# Patient Record
Sex: FEMALE | Race: WHITE | NOT HISPANIC OR LATINO | Employment: FULL TIME | URBAN - NONMETROPOLITAN AREA
[De-identification: names, ages, dates, MRNs, and addresses within clinical notes are randomized per-mention and may not be internally consistent; named-entity substitution may affect disease eponyms.]

---

## 2022-09-13 ENCOUNTER — OFFICE VISIT (OUTPATIENT)
Dept: ENDOCRINOLOGY | Facility: CLINIC | Age: 62
End: 2022-09-13

## 2022-09-13 ENCOUNTER — LAB (OUTPATIENT)
Dept: LAB | Facility: HOSPITAL | Age: 62
End: 2022-09-13

## 2022-09-13 VITALS
RESPIRATION RATE: 18 BRPM | HEIGHT: 67 IN | DIASTOLIC BLOOD PRESSURE: 78 MMHG | BODY MASS INDEX: 35.06 KG/M2 | SYSTOLIC BLOOD PRESSURE: 126 MMHG | WEIGHT: 223.4 LBS | HEART RATE: 76 BPM | OXYGEN SATURATION: 98 %

## 2022-09-13 DIAGNOSIS — L65.9 HAIR LOSS: ICD-10-CM

## 2022-09-13 DIAGNOSIS — E03.9 ACQUIRED HYPOTHYROIDISM: Primary | ICD-10-CM

## 2022-09-13 DIAGNOSIS — E66.9 CLASS 1 OBESITY WITH BODY MASS INDEX (BMI) OF 34.0 TO 34.9 IN ADULT, UNSPECIFIED OBESITY TYPE, UNSPECIFIED WHETHER SERIOUS COMORBIDITY PRESENT: ICD-10-CM

## 2022-09-13 PROBLEM — E66.811 CLASS 1 OBESITY WITH BODY MASS INDEX (BMI) OF 34.0 TO 34.9 IN ADULT: Status: ACTIVE | Noted: 2022-09-13

## 2022-09-13 LAB
ALBUMIN SERPL-MCNC: 4.3 G/DL (ref 3.5–5.2)
ALBUMIN/GLOB SERPL: 1.3 G/DL
ALP SERPL-CCNC: 73 U/L (ref 39–117)
ALT SERPL W P-5'-P-CCNC: 11 U/L (ref 1–33)
ANION GAP SERPL CALCULATED.3IONS-SCNC: 8 MMOL/L (ref 5–15)
AST SERPL-CCNC: 15 U/L (ref 1–32)
BASOPHILS # BLD AUTO: 0.07 10*3/MM3 (ref 0–0.2)
BASOPHILS NFR BLD AUTO: 0.9 % (ref 0–1.5)
BILIRUB SERPL-MCNC: 0.4 MG/DL (ref 0–1.2)
BUN SERPL-MCNC: 10 MG/DL (ref 8–23)
BUN/CREAT SERPL: 14.1 (ref 7–25)
CALCIUM SPEC-SCNC: 9.1 MG/DL (ref 8.6–10.5)
CHLORIDE SERPL-SCNC: 104 MMOL/L (ref 98–107)
CO2 SERPL-SCNC: 28 MMOL/L (ref 22–29)
CREAT SERPL-MCNC: 0.71 MG/DL (ref 0.57–1)
DEPRECATED RDW RBC AUTO: 44.7 FL (ref 37–54)
EGFRCR SERPLBLD CKD-EPI 2021: 96.9 ML/MIN/1.73
EOSINOPHIL # BLD AUTO: 0.13 10*3/MM3 (ref 0–0.4)
EOSINOPHIL NFR BLD AUTO: 1.6 % (ref 0.3–6.2)
ERYTHROCYTE [DISTWIDTH] IN BLOOD BY AUTOMATED COUNT: 13.1 % (ref 12.3–15.4)
GLOBULIN UR ELPH-MCNC: 3.2 GM/DL
GLUCOSE SERPL-MCNC: 91 MG/DL (ref 65–99)
HCT VFR BLD AUTO: 43.2 % (ref 34–46.6)
HGB BLD-MCNC: 14.4 G/DL (ref 12–15.9)
IMM GRANULOCYTES # BLD AUTO: 0.03 10*3/MM3 (ref 0–0.05)
IMM GRANULOCYTES NFR BLD AUTO: 0.4 % (ref 0–0.5)
LYMPHOCYTES # BLD AUTO: 2.72 10*3/MM3 (ref 0.7–3.1)
LYMPHOCYTES NFR BLD AUTO: 34.3 % (ref 19.6–45.3)
MCH RBC QN AUTO: 30.8 PG (ref 26.6–33)
MCHC RBC AUTO-ENTMCNC: 33.3 G/DL (ref 31.5–35.7)
MCV RBC AUTO: 92.5 FL (ref 79–97)
MONOCYTES # BLD AUTO: 0.64 10*3/MM3 (ref 0.1–0.9)
MONOCYTES NFR BLD AUTO: 8.1 % (ref 5–12)
NEUTROPHILS NFR BLD AUTO: 4.33 10*3/MM3 (ref 1.7–7)
NEUTROPHILS NFR BLD AUTO: 54.7 % (ref 42.7–76)
NRBC BLD AUTO-RTO: 0 /100 WBC (ref 0–0.2)
PLATELET # BLD AUTO: 298 10*3/MM3 (ref 140–450)
PMV BLD AUTO: 11.2 FL (ref 6–12)
POTASSIUM SERPL-SCNC: 4.2 MMOL/L (ref 3.5–5.2)
PROT SERPL-MCNC: 7.5 G/DL (ref 6–8.5)
RBC # BLD AUTO: 4.67 10*6/MM3 (ref 3.77–5.28)
SODIUM SERPL-SCNC: 140 MMOL/L (ref 136–145)
WBC NRBC COR # BLD: 7.92 10*3/MM3 (ref 3.4–10.8)

## 2022-09-13 PROCEDURE — 86376 MICROSOMAL ANTIBODY EACH: CPT | Performed by: NURSE PRACTITIONER

## 2022-09-13 PROCEDURE — 86258 DGP ANTIBODY EACH IG CLASS: CPT | Performed by: NURSE PRACTITIONER

## 2022-09-13 PROCEDURE — 84466 ASSAY OF TRANSFERRIN: CPT | Performed by: NURSE PRACTITIONER

## 2022-09-13 PROCEDURE — 82784 ASSAY IGA/IGD/IGG/IGM EACH: CPT | Performed by: NURSE PRACTITIONER

## 2022-09-13 PROCEDURE — 99204 OFFICE O/P NEW MOD 45 MIN: CPT | Performed by: NURSE PRACTITIONER

## 2022-09-13 PROCEDURE — 36415 COLL VENOUS BLD VENIPUNCTURE: CPT | Performed by: NURSE PRACTITIONER

## 2022-09-13 PROCEDURE — 80050 GENERAL HEALTH PANEL: CPT | Performed by: NURSE PRACTITIONER

## 2022-09-13 PROCEDURE — 82306 VITAMIN D 25 HYDROXY: CPT | Performed by: NURSE PRACTITIONER

## 2022-09-13 PROCEDURE — 82607 VITAMIN B-12: CPT | Performed by: NURSE PRACTITIONER

## 2022-09-13 PROCEDURE — 86364 TISS TRNSGLTMNASE EA IG CLAS: CPT | Performed by: NURSE PRACTITIONER

## 2022-09-13 PROCEDURE — 83540 ASSAY OF IRON: CPT | Performed by: NURSE PRACTITIONER

## 2022-09-13 RX ORDER — PANTOPRAZOLE SODIUM 40 MG/1
TABLET, DELAYED RELEASE ORAL
COMMUNITY

## 2022-09-13 RX ORDER — LEVOTHYROXINE SODIUM 0.1 MG/1
TABLET ORAL
COMMUNITY
Start: 2022-05-13 | End: 2022-09-15

## 2022-09-13 RX ORDER — NYSTATIN 100000 U/G
OINTMENT TOPICAL
COMMUNITY

## 2022-09-13 RX ORDER — NYSTATIN 100000 [USP'U]/G
POWDER TOPICAL
COMMUNITY
Start: 2022-02-08

## 2022-09-13 NOTE — PROGRESS NOTES
"Chief Complaint  Establish Care (thyroid)    Subjective          Roma Saab presents to [facility] ENDOCRINOLOGY  History of Present Illness     In office visit     Referring provider Albert Dale MD       Chief Complaint   Patient presents with   • Establish Care     thyroid       HPI    61 year old female presents for consultation         Reason -- hypothyroidism     Duration-- diagnosed at age 42  (2002 )     Context --routine lab work     Timing --constant     Quality  Not controlled     Severity high     Jan. 2022  Had COVID and trouble with thyroid labs since at that point       allevating factors compliance with medication    Brand name only     Last TSH 0.1      Review of Systems - General ROS: positive for  - fatigue, weight gain and hair loss  Endocrine ROS: positive for - malaise/lethargy                  Objective   Vital Signs:   /78   Pulse 76   Resp 18   Ht 170.2 cm (67\")   Wt 101 kg (223 lb 6.4 oz)   SpO2 98%   BMI 34.99 kg/m²     Physical Exam  Constitutional:       Appearance: Normal appearance.   Cardiovascular:      Rate and Rhythm: Regular rhythm.      Heart sounds: Normal heart sounds.   Pulmonary:      Breath sounds: Normal breath sounds.   Musculoskeletal:         General: Normal range of motion.      Cervical back: Normal range of motion and neck supple.   Neurological:      Mental Status: She is alert.        Result Review :   The following data was reviewed by: NEFTALY Madera on 09/13/2022:                Assessment and Plan    Diagnoses and all orders for this visit:    1. Acquired hypothyroidism (Primary)  -     CBC & Differential  -     Comprehensive Metabolic Panel  -     TSH  -     Vitamin D 25 Hydroxy  -     Vitamin B12  -     Celiac Panel Reflex To Titer  -     Iron Profile  -     Thyroid Peroxidase Antibody    2. Hair loss  -     CBC & Differential  -     Comprehensive Metabolic Panel  -     TSH  -     Vitamin D 25 Hydroxy  - "     Vitamin B12  -     Celiac Panel Reflex To Titer  -     Iron Profile  -     Thyroid Peroxidase Antibody    3. Class 1 obesity with body mass index (BMI) of 34.0 to 34.9 in adult, unspecified obesity type, unspecified whether serious comorbidity present                   Thyroid Health    Hypothyroidism       May 2022     TSH - 0.124     Taking brand name synthroid 100 mcg daily       Was on NP thyroid in the past     Reassess TSH today       She does take her medication correctly     Check for malabsorption     Celiac panel, iron profile, B12 and vitamin d     Hair loss      Assess above labs     Weight management    Obesity     Body mass index is 34.99 kg/m².      Records from  received and reviewed from 2022  Thank you for this consultation         Follow Up   Return in about 3 months (around 12/13/2022).  Patient was given instructions and counseling regarding her condition or for health maintenance advice. Please see specific information pulled into the AVS if appropriate.         This document has been electronically signed by NEFTALY Madera on September 13, 2022 15:42 CDT.

## 2022-09-14 ENCOUNTER — TELEPHONE (OUTPATIENT)
Dept: ENDOCRINOLOGY | Facility: CLINIC | Age: 62
End: 2022-09-14

## 2022-09-14 DIAGNOSIS — E03.9 ACQUIRED HYPOTHYROIDISM: Primary | ICD-10-CM

## 2022-09-14 LAB
25(OH)D3 SERPL-MCNC: 27 NG/ML (ref 30–100)
IRON 24H UR-MRATE: 75 MCG/DL (ref 37–145)
IRON SATN MFR SERPL: 19 % (ref 20–50)
TIBC SERPL-MCNC: 393 MCG/DL (ref 298–536)
TRANSFERRIN SERPL-MCNC: 264 MG/DL (ref 200–360)
TSH SERPL DL<=0.05 MIU/L-ACNC: 1.49 UIU/ML (ref 0.27–4.2)
VIT B12 BLD-MCNC: 448 PG/ML (ref 211–946)

## 2022-09-15 LAB
GLIADIN PEPTIDE IGA SER-ACNC: 2 UNITS (ref 0–19)
IGA SERPL-MCNC: 174 MG/DL (ref 87–352)
THYROPEROXIDASE AB SERPL-ACNC: 15 IU/ML (ref 0–34)
TTG IGA SER-ACNC: <2 U/ML (ref 0–3)

## 2022-09-15 RX ORDER — LEVOTHYROXINE SODIUM 0.1 MG/1
100 TABLET ORAL DAILY
COMMUNITY

## 2022-11-09 ENCOUNTER — TELEPHONE (OUTPATIENT)
Dept: ENDOCRINOLOGY | Facility: CLINIC | Age: 62
End: 2022-11-09

## 2022-11-09 NOTE — TELEPHONE ENCOUNTER
Pt called requesting to speak with Derrek. She stated she has been messaging back and forth with her and it's not making sense to her what Derrek is saying and she would like to speak with her to clarify. Could someone please reach out to her.    Please advise.    Thanks

## 2024-09-09 ENCOUNTER — PATIENT ROUNDING (BHMG ONLY) (OUTPATIENT)
Dept: FAMILY MEDICINE CLINIC | Facility: CLINIC | Age: 64
End: 2024-09-09
Payer: COMMERCIAL

## 2024-09-09 ENCOUNTER — OFFICE VISIT (OUTPATIENT)
Dept: FAMILY MEDICINE CLINIC | Facility: CLINIC | Age: 64
End: 2024-09-09
Payer: COMMERCIAL

## 2024-09-09 ENCOUNTER — TELEPHONE (OUTPATIENT)
Dept: FAMILY MEDICINE CLINIC | Facility: CLINIC | Age: 64
End: 2024-09-09

## 2024-09-09 VITALS
OXYGEN SATURATION: 96 % | SYSTOLIC BLOOD PRESSURE: 120 MMHG | WEIGHT: 235.4 LBS | TEMPERATURE: 97.7 F | DIASTOLIC BLOOD PRESSURE: 81 MMHG | HEART RATE: 70 BPM | HEIGHT: 67 IN | BODY MASS INDEX: 36.95 KG/M2

## 2024-09-09 DIAGNOSIS — R09.81 SINUS CONGESTION: ICD-10-CM

## 2024-09-09 DIAGNOSIS — Z23 NEED FOR TDAP VACCINATION: ICD-10-CM

## 2024-09-09 DIAGNOSIS — Z12.31 ENCOUNTER FOR SCREENING MAMMOGRAM FOR MALIGNANT NEOPLASM OF BREAST: ICD-10-CM

## 2024-09-09 DIAGNOSIS — Z00.00 ENCOUNTER FOR MEDICAL EXAMINATION TO ESTABLISH CARE: Primary | ICD-10-CM

## 2024-09-09 DIAGNOSIS — R11.0 NAUSEA: Primary | ICD-10-CM

## 2024-09-09 DIAGNOSIS — Z00.00 ENCOUNTER FOR ANNUAL PHYSICAL EXAM: ICD-10-CM

## 2024-09-09 DIAGNOSIS — Z86.010 HISTORY OF COLON POLYPS: ICD-10-CM

## 2024-09-09 PROCEDURE — 99396 PREV VISIT EST AGE 40-64: CPT | Performed by: NURSE PRACTITIONER

## 2024-09-09 PROCEDURE — 99214 OFFICE O/P EST MOD 30 MIN: CPT | Performed by: NURSE PRACTITIONER

## 2024-09-09 RX ORDER — NAPROXEN 500 MG/1
500 TABLET ORAL AS NEEDED
COMMUNITY

## 2024-09-09 RX ORDER — SEMAGLUTIDE 1.34 MG/ML
0.25 INJECTION, SOLUTION SUBCUTANEOUS WEEKLY
COMMUNITY
Start: 2024-07-09

## 2024-09-09 RX ORDER — FEXOFENADINE HCL AND PSEUDOEPHEDRINE HCI 60; 120 MG/1; MG/1
1 TABLET, EXTENDED RELEASE ORAL 2 TIMES DAILY
Qty: 30 TABLET | Refills: 2 | Status: SHIPPED | OUTPATIENT
Start: 2024-09-09

## 2024-09-09 RX ORDER — ONDANSETRON 4 MG/1
4 TABLET, ORALLY DISINTEGRATING ORAL EVERY 8 HOURS PRN
Qty: 30 TABLET | Refills: 1 | Status: SHIPPED | OUTPATIENT
Start: 2024-09-09

## 2024-09-09 RX ORDER — ONDANSETRON 8 MG/1
8 TABLET, ORALLY DISINTEGRATING ORAL EVERY 8 HOURS PRN
COMMUNITY
End: 2024-09-09 | Stop reason: SDUPTHER

## 2024-09-09 NOTE — PROGRESS NOTES
NEFTALY Collado  [facility]   Family Medicine  [address]  [place] [address]  Phone: [phone]  Fax: [phone]         Chief Complaint:  Chief Complaint   Patient presents with    [facility] [place] [facility]        History:  Roma Saab is a 63 y.o. female that is an established patient. She  is here for evaluation of the above complaint and management of chronic conditions.    HPI   History of Present Illness  The patient presents for evaluation of multiple medical concerns.    She reports that her weight has remained stable since starting semaglutide, but she has noticed a decrease in her waist size and her clothes are fitting more loosely. She is currently working from home and has resumed walking. Her ideal weight is 165 pounds, but she is aiming for an initial goal of 200 pounds. She is currently on 0.25 mg compounded semaglutide from \[facility]"" [facility].    She mentions that she had thrush under her breast, which has improved with the use of prebiotics and probiotics.    She is on Synthroid 100 mcg, taken six days a week. Her last thyroid study was conducted on 06/02/2024, with a TSH level of 2.59. she has seen endocrinology in [place].    She underwent a partial hysterectomy due to endometriosis in her 40s, with her uterus removed but ovaries retained. She did not experience menopause in her 50s. She is due for a mammogram and has not received a tetanus vaccine in the past 10 years.    She experienced severe headaches for over a month, accompanied by constant congestion. She also reports vision and ear problems due to sinus issues. She found relief from Claritin-D, which she took daily until her supply ran out. She has tried Flonase nasal spray in the past without success.    She has a history of polyps, with approximately 20 detected during a colonoscopy two years ago. She expresses concern about the number of polyps and the recommended 10-year interval between  colonoscopies. She also mentions a previous detection of precancerous cells in [place], which were removed and required follow-up every six months.    On 07/19/2024, she tripped at work and dislocated her shoulder and broke her humerus. She is now 75 percent healed. She has been doing a lot of exercises in the pool. She did not have surgery; the shoulder was put back in place. Today is her first day back to work. She used to be a very energetic person. She has already done physical therapy. She went to the ortho doctor last Wednesday and she was ready to move on and do something more strenuous. She has 6 visits left for physical therapy.    FAMILY HISTORY  She denies any family history of breast cancer.      Results  Laboratory Studies  TSH 2.59.       ROS:  Review of Systems   Constitutional: Negative.    HENT:  Positive for congestion. Negative for rhinorrhea, sinus pressure and sinus pain.    Respiratory: Negative.     Cardiovascular: Negative.    Gastrointestinal: Negative.    Musculoskeletal:         Left shoulder decreased ROM   Psychiatric/Behavioral: Negative.           reports that she has quit smoking. Her smoking use included cigarettes. She started smoking about 30 years ago. She has a 30 pack-year smoking history. She has been exposed to tobacco smoke. She has never used smokeless tobacco. She reports that she does not currently use alcohol. She reports that she does not use drugs.    Current Outpatient Medications   Medication Instructions    fexofenadine-pseudoephedrine (Allegra-D Allergy & Congestion)  MG per 12 hr tablet 1 tablet, Oral, 2 Times Daily    levothyroxine (SYNTHROID, LEVOTHROID) 100 mcg, Oral, Daily    naproxen (NAPROSYN) 500 mg, Oral, As Needed    nystatin (MYCOSTATIN) 811938 UNIT/GM ointment nystatin 100,000 unit/gram topical ointment    nystatin (MYCOSTATIN) 826905 UNIT/GM powder APPLY TO THE AFFECTED AREA(S) BY TOPICAL ROUTE 2 TIMES PER DAY    ondansetron ODT (ZOFRAN-ODT) 8  "mg, Translingual, Every 8 Hours PRN    Ozempic (0.25 or 0.5 MG/DOSE) 0.25 mg, Subcutaneous, Weekly    pantoprazole (Protonix) 40 MG EC tablet Protonix 40 mg tablet,delayed release   Take 1 tablet every day by oral route.       OBJECTIVE:  /81 (BP Location: Right arm, Patient Position: Sitting, Cuff Size: Adult)   Pulse 70   Temp 97.7 °F (36.5 °C) (Temporal)   Ht 170.2 cm (67\")   Wt 107 kg (235 lb 6.4 oz)   SpO2 96%   BMI 36.87 kg/m²    Physical Exam  Vitals and nursing note reviewed.   Constitutional:       Appearance: Normal appearance.   HENT:      Head: Normocephalic and atraumatic.      Right Ear: Tympanic membrane, ear canal and external ear normal.      Left Ear: Tympanic membrane, ear canal and external ear normal.      Nose: Nose normal.      Mouth/Throat:      Mouth: Mucous membranes are moist.      Pharynx: Oropharynx is clear.   Eyes:      Extraocular Movements: Extraocular movements intact.      Conjunctiva/sclera: Conjunctivae normal.      Pupils: Pupils are equal, round, and reactive to light.   Cardiovascular:      Rate and Rhythm: Normal rate and regular rhythm.      Pulses: Normal pulses.      Heart sounds: Normal heart sounds.   Pulmonary:      Effort: Pulmonary effort is normal.      Breath sounds: Normal breath sounds.   Abdominal:      General: Bowel sounds are normal.      Palpations: Abdomen is soft.   Genitourinary:     Rectum: Normal.   Musculoskeletal:      Cervical back: Normal range of motion and neck supple.   Skin:     General: Skin is warm and dry.      Capillary Refill: Capillary refill takes less than 2 seconds.   Neurological:      General: No focal deficit present.      Mental Status: She is alert and oriented to person, place, and time.   Psychiatric:         Mood and Affect: Mood normal.         Behavior: Behavior normal.         Thought Content: Thought content normal.       Physical Exam  Vital Signs  Weight is 235 today.    Procedures    Assessment/Plan:   "   Diagnoses and all orders for this visit:    1. Encounter for medical examination to establish care (Primary)    2. Encounter for screening mammogram for malignant neoplasm of breast  -     Mammo Screening Digital Tomosynthesis Bilateral With CAD; Future    3. Need for Tdap vaccination    4. Sinus congestion  -     fexofenadine-pseudoephedrine (Allegra-D Allergy & Congestion)  MG per 12 hr tablet; Take 1 tablet by mouth 2 (Two) Times a Day.  Dispense: 30 tablet; Refill: 2    5. History of colon polyps  -     Ambulatory Referral to Gastroenterology    6. Encounter for annual physical exam  -Immunizations:      - Tetanus: Due, but refused.      - Influenza: Recommend yearly.      - Prevnar: Once after age 65      - Shingrix: Series after 50      - COVID: recommended  CRC screening: Colonoscopy referral placed  Mammogram: Mammogram ordered today.  PAP: discontinued secondary to benign hysterectomy.  DEXA: DEXA scan at 65            Assessment & Plan  1. Weight management.  She has been on semaglutide and reports a noticeable difference in her clothing fit, though the scale has not yet reflected significant weight loss. The goal is to reach 200 pounds initially, with a long-term goal of 165 pounds. A prescription for compounded semaglutide will be provided, starting at 0.5 mg and increasing to 1.0 mg as tolerated. She is advised to continue her exercise regimen, including walking and pool exercises.    2. Hypothyroidism.  She is currently taking Synthroid 100 mcg six days a week, as advised by her endocrinologist. Her last thyroid study on June 2 showed a TSH level of 2.59, which is within the normal range. No refill of Synthroid is required at this time.    3. Sinus congestion.  She has experienced sinus congestion and headaches, which improved with Claritin-D. A prescription for Allegra-D will be provided to see if it offers similar relief. She is advised to continue using pre and probiotics, which have helped  with her symptoms.    4. Left shoulder dislocation and humerus fracture.  She is 75% healed from her injury on July 19. She has been doing pool exercises and walking to aid recovery. She has completed physical therapy but feels she needs more strenuous exercises. She is advised to continue her home exercise regimen and follow up with her orthopedic doctor if needed.    5. Health Maintenance.  A mammogram will be ordered as she is due for one. The influenza vaccine will be administered closer to October 1, 2024. A tetanus vaccine is recommended but she prefers to wait. A referral to gastroenterology will be made due to her history of multiple polyps found during a colonoscopy two years ago.      An After Visit Summary was printed and given to the patient at discharge.  Return in about 3 months (around 12/9/2024).       There are no Patient Instructions on file for this visit.      Discussion:         I spent 35 minutes caring for Roma on this date of service. This time includes time spent by me in the following activities: preparing for the visit, reviewing tests, performing a medically appropriate examination and/or evaluation, counseling and educating the patient/family/caregiver, referring and communicating with other health care professionals, documenting information in the medical record, independently interpreting results and communicating that information with the patient/family/caregiver, care coordination, ordering medications, obtaining a separately obtained history, and reviewing a separately obtained history   Patient or patient representative verbalized consent for the use of Ambient Listening during the visit with  NEFTALY Collado for chart documentation. 9/9/2024  12:50 CDT    Angie HIGGINBOTHAM 9/9/2024   Electronically signed.

## 2024-09-09 NOTE — PROGRESS NOTES
"September 9, 2024    Hello, may I speak with Roma Saab?    My name is JACQUIE     I am  with [facility]  [address] [place] [id number] [phone].    Before we get started may I verify your date of birth? 1960    I am calling to officially welcome you to our practice and ask about your recent visit. Is this a good time to talk? yes    Tell me about your visit with us. What things went well?  WONDERFUL FROM A SCALE OF 1 TO 10 IT WAS A \"20\" VERY RARE TO FIND AN OFFICE LIKE THIS!       We're always looking for ways to make our patients' experiences even better. Do you have recommendations on ways we may improve?  no    Overall were you satisfied with your first visit to our practice? yes       I appreciate you taking the time to speak with me today. Is there anything else I can do for you? no      Thank you, and have a great day.      " done

## 2024-10-08 LAB
NCCN CRITERIA FLAG: NORMAL
TYRER CUZICK SCORE: 5.5

## 2024-12-05 ENCOUNTER — TELEMEDICINE (OUTPATIENT)
Dept: FAMILY MEDICINE CLINIC | Facility: CLINIC | Age: 64
End: 2024-12-05
Payer: COMMERCIAL

## 2024-12-05 VITALS — WEIGHT: 221 LBS | HEIGHT: 67 IN | BODY MASS INDEX: 34.69 KG/M2

## 2024-12-05 DIAGNOSIS — K21.9 GASTROESOPHAGEAL REFLUX DISEASE WITHOUT ESOPHAGITIS: ICD-10-CM

## 2024-12-05 DIAGNOSIS — R11.0 NAUSEA: ICD-10-CM

## 2024-12-05 DIAGNOSIS — E66.811 CLASS 1 OBESITY WITH BODY MASS INDEX (BMI) OF 34.0 TO 34.9 IN ADULT, UNSPECIFIED OBESITY TYPE, UNSPECIFIED WHETHER SERIOUS COMORBIDITY PRESENT: Primary | ICD-10-CM

## 2024-12-05 DIAGNOSIS — Z00.00 HEALTHCARE MAINTENANCE: ICD-10-CM

## 2024-12-05 DIAGNOSIS — E03.9 ACQUIRED HYPOTHYROIDISM: ICD-10-CM

## 2024-12-05 DIAGNOSIS — Z28.21 FLU VACCINE REFUSED: ICD-10-CM

## 2024-12-05 DIAGNOSIS — Z86.0100 HISTORY OF COLON POLYPS: ICD-10-CM

## 2024-12-05 PROCEDURE — 99214 OFFICE O/P EST MOD 30 MIN: CPT | Performed by: NURSE PRACTITIONER

## 2024-12-05 NOTE — PROGRESS NOTES
NEFTALY Collado  [facility]   Family Medicine  [address]  [place] [address]  Phone: [phone]  Fax: [phone]     Roma Saab is a 64 y.o. female.   : 1960    This visit is conducted today via video visit.  You have chosen to receive care through a telehealth visit.  Do you consent to use a video/audio connection for your medical care today? YES    Pt located at Home and provider located at work office.     CC:   Chief Complaint   Patient presents with    Medication Problem     Patient stated that the wegovy is making her sick.        HPI: Roma Saab is a 64 y.o. female that is an established patient. She  is here for evaluation of the above complaint and management of chronic conditions.    She is having increased nausea and GERD with the first 1-2 doses of the 0.5 mg semaglutide. Despite this, she would like to increase to 1 mg dose. She has pantoprazole that she takes irregularly. She has lost 12 pounds since starting compounded semaglutide.    She is due for yearly labs, that she will get in [place]. She is taking name-brand Synthroid 100mcg.     She has an appt with GI at [facility] 2024 for consult for screening c/scope. She had her mammogram scheduled for this month but is rescheduling due to starting a new job.    She denies mood changes, denies depression or anxiety. She declines flu vaccine.      The following portions of the patient's history were reviewed and updated as appropriate: allergies, current medications, past family history, past medical history, past social history, past surgical history, and problem list.  Past Medical History:   Diagnosis Date    Hypothyroid      Family History   Problem Relation Age of Onset    Thyroid disease Mother     Thyroid disease Sister      Social History     Socioeconomic History    Marital status:    Tobacco Use    Smoking status: Former     Current packs/day: 1.00     Average packs/day: 1 pack/day for  "30.2 years (30.2 ttl pk-yrs)     Types: Cigarettes     Start date: 9/9/1994     Passive exposure: Past    Smokeless tobacco: Never   Vaping Use    Vaping status: Never Used   Substance and Sexual Activity    Alcohol use: Not Currently    Drug use: Never    Sexual activity: Never     Review of Systems   Constitutional: Negative.    HENT: Negative.     Respiratory: Negative.     Cardiovascular: Negative.    Gastrointestinal:  Positive for nausea and indigestion.   Psychiatric/Behavioral: Negative.       Ht 170.2 cm (67.01\")   Wt 100 kg (221 lb)   BMI 34.61 kg/m²   Physical Exam  Vitals and nursing note reviewed.   Constitutional:       Appearance: Normal appearance.   HENT:      Head: Normocephalic and atraumatic.      Nose: Nose normal.   Eyes:      Conjunctiva/sclera: Conjunctivae normal.   Pulmonary:      Effort: Pulmonary effort is normal.   Neurological:      General: No focal deficit present.      Mental Status: She is alert and oriented to person, place, and time.   Psychiatric:         Mood and Affect: Mood normal.         Behavior: Behavior normal.           Assessment and Plan:   Diagnoses and all orders for this visit:    1. Class 1 obesity with body mass index (BMI) of 34.0 to 34.9 in adult, unspecified obesity type, unspecified whether serious comorbidity present (Primary)  -     Hemoglobin A1c; Future  -     Lipid Panel; Future  -     T4, Free; Future  -     TSH; Future  -     Comprehensive Metabolic Panel; Future    2. Acquired hypothyroidism  -     T4, Free; Future  -     TSH; Future    3. Healthcare maintenance  -     Hemoglobin A1c; Future  -     Lipid Panel; Future  -     T4, Free; Future  -     TSH; Future  -     Comprehensive Metabolic Panel; Future  -     CBC & Differential; Future  -     Hepatitis C antibody; Future    4. Flu vaccine refused    5. Nausea    6. History of colon polyps    7. Gastroesophageal reflux disease without esophagitis    Other orders  -     Semaglutide-Weight Management " 1 MG/0.5ML solution auto-injector; Inject 0.5 mL under the skin into the appropriate area as directed 1 (One) Time Per Week.  Dispense: 0.5 mL; Refill: 1      Plan:  Increase semaglutide to 1 mg/weekly  Pantoprazole every morning  Lab work, fasting  Reschedule mammogram  GI consult as scheduled    There are no Patient Instructions on file for this visit.    Discussion:     I spent 35 minutes caring for Roma on this date of service. This time includes time spent by me in the following activities: preparing for the visit, reviewing tests, performing a medically appropriate examination and/or evaluation, counseling and educating the patient/family/caregiver, documenting information in the medical record, independently interpreting results and communicating that information with the patient/family/caregiver, care coordination, ordering test(s), obtaining a separately obtained history, and reviewing a separately obtained history     Follow up: 3 months, sooner if needed  NEFTALY Collado  12/5/2024  14:49 CST

## 2024-12-11 ENCOUNTER — TELEMEDICINE (OUTPATIENT)
Dept: FAMILY MEDICINE CLINIC | Facility: CLINIC | Age: 64
End: 2024-12-11
Payer: COMMERCIAL

## 2024-12-11 VITALS — HEIGHT: 67 IN | WEIGHT: 220 LBS | BODY MASS INDEX: 34.53 KG/M2

## 2024-12-11 DIAGNOSIS — E66.811 CLASS 1 OBESITY WITH BODY MASS INDEX (BMI) OF 34.0 TO 34.9 IN ADULT, UNSPECIFIED OBESITY TYPE, UNSPECIFIED WHETHER SERIOUS COMORBIDITY PRESENT: Primary | ICD-10-CM

## 2024-12-11 PROCEDURE — 99213 OFFICE O/P EST LOW 20 MIN: CPT | Performed by: NURSE PRACTITIONER

## 2024-12-11 NOTE — PROGRESS NOTES
NEFTALY Collado  [facility]   Family Medicine  [address]  [place] [address]  Phone: [phone]  Fax: [phone]     Roma Saab is a 64 y.o. female.   : 1960    This visit is conducted today via video visit.  You have chosen to receive care through a telehealth visit.  Do you consent to use a video/audio connection for your medical care today? YES    Pt located at Home and provider located at work office.     CC:   Chief Complaint   Patient presents with    Med Refill     To start a new medication        HPI: Roma Saab is a 64 y.o. female that is an established patient. She  is here for evaluation of the above complaint.    She did well on compounded semaglutide, losing 12 pounds at her last visit, but hasn't weighed since that time. The compounded semaglutide has gotten too expensive and she is interested in Contrave.    She has tried a combination of phentermine and topiramate with modest benefit. She has no history of hypertension and doesn't take any opiates or antidepressants. We discussed the instructions for Contrave and she would like to try that.    She will reschedule her mammogram.    The following portions of the patient's history were reviewed and updated as appropriate: allergies, current medications, past family history, past medical history, past social history, past surgical history, and problem list.  Past Medical History:   Diagnosis Date    Hypothyroid      Family History   Problem Relation Age of Onset    Thyroid disease Mother     Thyroid disease Sister      Social History     Socioeconomic History    Marital status:    Tobacco Use    Smoking status: Former     Current packs/day: 1.00     Average packs/day: 1 pack/day for 30.3 years (30.3 ttl pk-yrs)     Types: Cigarettes     Start date: 1994     Passive exposure: Past    Smokeless tobacco: Never   Vaping Use    Vaping status: Never Used   Substance and Sexual Activity    Alcohol  "use: Not Currently    Drug use: Never    Sexual activity: Never     Review of Systems   Constitutional: Negative.    HENT: Negative.     Respiratory: Negative.     Gastrointestinal: Negative.    Psychiatric/Behavioral: Negative.       Ht 170.2 cm (67.02\")   Wt 99.8 kg (220 lb)   BMI 34.44 kg/m²   Physical Exam  Vitals and nursing note reviewed.   Constitutional:       Appearance: Normal appearance.   HENT:      Head: Normocephalic and atraumatic.      Nose: Nose normal.   Eyes:      Conjunctiva/sclera: Conjunctivae normal.   Pulmonary:      Effort: Pulmonary effort is normal.   Neurological:      General: No focal deficit present.      Mental Status: She is alert and oriented to person, place, and time.   Psychiatric:         Mood and Affect: Mood normal.         Behavior: Behavior normal.           Assessment and Plan:   Diagnoses and all orders for this visit:    1. Class 1 obesity with body mass index (BMI) of 34.0 to 34.9 in adult, unspecified obesity type, unspecified whether serious comorbidity present (Primary)  -     naltrexone-bupropion ER (CONTRAVE) 8-90 MG tablet; Wk 1: 1 tab QHS, Wk 2: 1 tab BID, Wk 3: 1 tabs QAM, 2 tabs QHS Wk 4: 2 tabs bid. Maintenance dose: 2 tabs twice daily.  Dispense: 120 tablet; Refill: 2          Patient Instructions   Contrave, 1 tablet in the morning for 1 week, 1 tablet in the morning and late afternoon/evening for 1 week, 2 tablets in the morning and 1 in the evening for 1 week, and then 2 tablets twice a day thereafter.  Reschedule mammogram    Discussion:     I spent 25 minutes caring for Roma on this date of service. This time includes time spent by me in the following activities: preparing for the visit, reviewing tests, performing a medically appropriate examination and/or evaluation, counseling and educating the patient/family/caregiver, documenting information in the medical record, independently interpreting results and communicating that information with the " patient/family/caregiver, care coordination, ordering medications, obtaining a separately obtained history, and reviewing a separately obtained history     Follow up: 2 months  NEFTALY Collado  12/11/2024  08:57 CST

## 2024-12-11 NOTE — PATIENT INSTRUCTIONS
Contrave, 1 tablet in the morning for 1 week, 1 tablet in the morning and late afternoon/evening for 1 week, 2 tablets in the morning and 1 in the evening for 1 week, and then 2 tablets twice a day thereafter.  Reschedule mammogram

## 2024-12-13 DIAGNOSIS — E66.811 CLASS 1 OBESITY WITH BODY MASS INDEX (BMI) OF 34.0 TO 34.9 IN ADULT, UNSPECIFIED OBESITY TYPE, UNSPECIFIED WHETHER SERIOUS COMORBIDITY PRESENT: Primary | ICD-10-CM

## 2024-12-13 RX ORDER — PHENTERMINE HYDROCHLORIDE 37.5 MG/1
37.5 TABLET ORAL
Qty: 30 TABLET | Refills: 0 | Status: SHIPPED | OUTPATIENT
Start: 2024-12-13 | End: 2025-01-12

## 2024-12-13 RX ORDER — TOPIRAMATE 25 MG/1
TABLET, FILM COATED ORAL
Qty: 60 TABLET | Refills: 1 | Status: SHIPPED | OUTPATIENT
Start: 2024-12-13

## 2025-03-21 DIAGNOSIS — E66.811 CLASS 1 OBESITY WITH BODY MASS INDEX (BMI) OF 34.0 TO 34.9 IN ADULT, UNSPECIFIED OBESITY TYPE, UNSPECIFIED WHETHER SERIOUS COMORBIDITY PRESENT: ICD-10-CM

## 2025-03-24 RX ORDER — PHENTERMINE HYDROCHLORIDE 37.5 MG/1
37.5 TABLET ORAL
Qty: 30 TABLET | OUTPATIENT
Start: 2025-03-24

## 2025-05-14 DIAGNOSIS — E03.9 ACQUIRED HYPOTHYROIDISM: Primary | ICD-10-CM

## 2025-05-14 RX ORDER — LEVOTHYROXINE SODIUM 100 UG/1
100 TABLET ORAL
COMMUNITY
End: 2025-05-14 | Stop reason: SDUPTHER

## 2025-05-14 RX ORDER — LEVOTHYROXINE SODIUM 100 UG/1
100 TABLET ORAL
COMMUNITY
End: 2025-05-14

## 2025-05-14 RX ORDER — LEVOTHYROXINE SODIUM 100 MCG
100 TABLET ORAL
Qty: 30 TABLET | Refills: 0 | Status: SHIPPED | OUTPATIENT
Start: 2025-05-14

## 2025-08-08 DIAGNOSIS — E03.9 ACQUIRED HYPOTHYROIDISM: ICD-10-CM

## 2025-08-08 RX ORDER — LEVOTHYROXINE SODIUM 100 MCG
100 TABLET ORAL
Qty: 30 TABLET | Refills: 0 | Status: SHIPPED | OUTPATIENT
Start: 2025-08-08